# Patient Record
Sex: FEMALE | Race: OTHER | HISPANIC OR LATINO
[De-identification: names, ages, dates, MRNs, and addresses within clinical notes are randomized per-mention and may not be internally consistent; named-entity substitution may affect disease eponyms.]

---

## 2021-01-06 ENCOUNTER — ASOB RESULT (OUTPATIENT)
Age: 41
End: 2021-01-06

## 2021-01-06 ENCOUNTER — APPOINTMENT (OUTPATIENT)
Dept: ANTEPARTUM | Facility: CLINIC | Age: 41
End: 2021-01-06
Payer: MEDICAID

## 2021-01-06 ENCOUNTER — APPOINTMENT (OUTPATIENT)
Dept: MATERNAL FETAL MEDICINE | Facility: CLINIC | Age: 41
End: 2021-01-06
Payer: MEDICAID

## 2021-01-06 PROCEDURE — 99441: CPT

## 2021-01-06 PROCEDURE — 99072 ADDL SUPL MATRL&STAF TM PHE: CPT

## 2021-01-06 PROCEDURE — 76817 TRANSVAGINAL US OBSTETRIC: CPT

## 2021-01-06 PROCEDURE — 76811 OB US DETAILED SNGL FETUS: CPT

## 2021-01-18 ENCOUNTER — ASOB RESULT (OUTPATIENT)
Age: 41
End: 2021-01-18

## 2021-01-18 ENCOUNTER — APPOINTMENT (OUTPATIENT)
Dept: ANTEPARTUM | Facility: CLINIC | Age: 41
End: 2021-01-18
Payer: MEDICAID

## 2021-01-18 PROCEDURE — 76815 OB US LIMITED FETUS(S): CPT

## 2021-01-18 PROCEDURE — 99072 ADDL SUPL MATRL&STAF TM PHE: CPT

## 2021-01-18 PROCEDURE — 76817 TRANSVAGINAL US OBSTETRIC: CPT

## 2021-02-15 ENCOUNTER — ASOB RESULT (OUTPATIENT)
Age: 41
End: 2021-02-15

## 2021-02-15 ENCOUNTER — APPOINTMENT (OUTPATIENT)
Dept: ANTEPARTUM | Facility: CLINIC | Age: 41
End: 2021-02-15
Payer: MEDICAID

## 2021-02-15 PROCEDURE — 76816 OB US FOLLOW-UP PER FETUS: CPT

## 2021-02-15 PROCEDURE — 99072 ADDL SUPL MATRL&STAF TM PHE: CPT

## 2021-02-15 PROCEDURE — 76817 TRANSVAGINAL US OBSTETRIC: CPT

## 2021-03-01 ENCOUNTER — APPOINTMENT (OUTPATIENT)
Dept: ANTEPARTUM | Facility: CLINIC | Age: 41
End: 2021-03-01

## 2021-03-04 ENCOUNTER — ASOB RESULT (OUTPATIENT)
Age: 41
End: 2021-03-04

## 2021-03-04 ENCOUNTER — APPOINTMENT (OUTPATIENT)
Dept: MATERNAL FETAL MEDICINE | Facility: CLINIC | Age: 41
End: 2021-03-04
Payer: MEDICAID

## 2021-03-04 VITALS — WEIGHT: 169 LBS | HEIGHT: 62 IN | BODY MASS INDEX: 31.1 KG/M2

## 2021-03-04 DIAGNOSIS — Z87.59 PERSONAL HISTORY OF OTHER COMPLICATIONS OF PREGNANCY, CHILDBIRTH AND THE PUERPERIUM: ICD-10-CM

## 2021-03-04 PROCEDURE — G0108 DIAB MANAGE TRN  PER INDIV: CPT | Mod: 95

## 2021-03-22 ENCOUNTER — APPOINTMENT (OUTPATIENT)
Dept: MATERNAL FETAL MEDICINE | Facility: CLINIC | Age: 41
End: 2021-03-22
Payer: MEDICAID

## 2021-03-22 ENCOUNTER — APPOINTMENT (OUTPATIENT)
Dept: ANTEPARTUM | Facility: CLINIC | Age: 41
End: 2021-03-22
Payer: MEDICAID

## 2021-03-22 ENCOUNTER — ASOB RESULT (OUTPATIENT)
Age: 41
End: 2021-03-22

## 2021-03-22 VITALS
OXYGEN SATURATION: 98 % | WEIGHT: 167 LBS | HEIGHT: 62 IN | HEART RATE: 78 BPM | BODY MASS INDEX: 30.73 KG/M2 | RESPIRATION RATE: 18 BRPM | DIASTOLIC BLOOD PRESSURE: 62 MMHG | SYSTOLIC BLOOD PRESSURE: 102 MMHG

## 2021-03-22 DIAGNOSIS — O34.219 MATERNAL CARE FOR UNSPECIFIED TYPE SCAR FROM PREVIOUS CESAREAN DELIVERY: ICD-10-CM

## 2021-03-22 DIAGNOSIS — O09.213 SUPERVISION OF PREGNANCY WITH HISTORY OF PRE-TERM LABOR, THIRD TRIMESTER: ICD-10-CM

## 2021-03-22 DIAGNOSIS — O09.293 SUPERVISION OF PREGNANCY WITH OTHER POOR REPRODUCTIVE OR OBSTETRIC HISTORY, THIRD TRIMESTER: ICD-10-CM

## 2021-03-22 DIAGNOSIS — Z87.51 PERSONAL HISTORY OF PRE-TERM LABOR: ICD-10-CM

## 2021-03-22 DIAGNOSIS — O09.523 SUPERVISION OF ELDERLY MULTIGRAVIDA, THIRD TRIMESTER: ICD-10-CM

## 2021-03-22 DIAGNOSIS — O99.213 OBESITY COMPLICATING PREGNANCY, THIRD TRIMESTER: ICD-10-CM

## 2021-03-22 PROCEDURE — 76816 OB US FOLLOW-UP PER FETUS: CPT

## 2021-03-22 PROCEDURE — 99072 ADDL SUPL MATRL&STAF TM PHE: CPT

## 2021-03-22 PROCEDURE — 99205 OFFICE O/P NEW HI 60 MIN: CPT | Mod: TH

## 2021-03-22 RX ORDER — METOCLOPRAMIDE 10 MG/1
10 TABLET ORAL
Qty: 90 | Refills: 0 | Status: DISCONTINUED | COMMUNITY
Start: 2020-11-09

## 2021-03-22 RX ORDER — DOCUSATE SODIUM 100 MG/1
100 CAPSULE, LIQUID FILLED ORAL
Qty: 30 | Refills: 0 | Status: DISCONTINUED | COMMUNITY
Start: 2020-10-28

## 2021-03-22 RX ORDER — ONDANSETRON 4 MG/1
4 TABLET ORAL
Qty: 10 | Refills: 0 | Status: DISCONTINUED | COMMUNITY
Start: 2020-10-05

## 2021-03-22 RX ORDER — ONDANSETRON 4 MG/1
4 TABLET, ORALLY DISINTEGRATING ORAL
Qty: 20 | Refills: 0 | Status: DISCONTINUED | COMMUNITY
Start: 2020-12-15

## 2021-03-22 RX ORDER — UBIDECARENONE/VIT E ACET 100MG-5
25 MCG CAPSULE ORAL
Qty: 30 | Refills: 0 | Status: DISCONTINUED | COMMUNITY
Start: 2020-10-28

## 2021-03-22 NOTE — FAMILY HISTORY
[Age 35+ During Pregnancy] : not 35 or over during pregnancy [Reported Family History Of Birth Defects] : no congenital heart defects [Orestes-Sachs Carrier] : no Orestes-Sachs [Family History] : no mental retardation/autism [Reported Family History Of Genetic Disease] : no history of child defect in child of baby father

## 2021-03-22 NOTE — VITALS
[LMP (date): ___] : LMP was on [unfilled] [GA =___ Weeks] : which calculates to a GA of [unfilled] weeks [GA= ___ Days] : and [unfilled] day(s) [MILA by LMP (date): ___] : The calculated MILA by LMP is [unfilled] [By LMP] : this is the final MILA

## 2021-03-22 NOTE — DATA REVIEWED
[FreeTextEntry1] : I reviewed the three-hour GTT test results, genetic counseling report, diabetes educator report, and ultrasound reports.

## 2021-03-22 NOTE — DISCUSSION/SUMMARY
[FreeTextEntry1] : She is 31 weeks and 2 days gestation by her last menstrual period dates.\par \par She is overweight and obesity has been associated with a number of maternal complications such as gestational diabetes, pre-eclampsia, thrombophlebitis, labor abnormalities, post-term pregnancies,  delivery, and operative complications. Obesity has been associated with adverse fetal outcomes such as late stillbirth and  deliveries.  Obese women also have a two to three-fold increased incidence in congenital anomalies. There were no major fetal malformations reported during the fetal anatomy ultrasound examination. She now has gestational diabetes. \par \par Regarding her gestational diabetes, she states that she has been following the recommended diabetic diet. She performs fasting and 2 hour postprandial self glucose monitoring. She was advised to stop doing two-hour postprandial glucose determination and to start doing a one hour postprandial glucose determination from the beginning of the meal. My review of her glucose log book from 3/7/21 to 3/22/21 revealed 9/15 = 60% elevated fasting glucose values. She had 6 elevated postprandial glucose values. She has suboptimal glycemic control. I informed her that maintaining euglycemia is the most important factor associated with good  outcomes in pregnancies complicated by gestational diabetes. I told her that poor glucose control may cause fetal macrosomia, shoulder dystocia,  delivery,  respiratory distress syndrome and  metabolic complications such as hypoglycemia and hyperbilirubinemia.  I told her that she is at risk for developing gestational hypertension or preeclampsia during the current pregnancy. I also told her that she is at risk for developing type 2 diabetes, metabolic syndrome and cardiovascular disease later in life.  I also recommend a 75 gram 2 hour OGTT approximately 6 - 8 weeks postpartum to determine whether she has impaired glucose tolerance or preexisting diabetes not diagnosed prior to the pregnancy. I gave her additional dietary counseling. I gave her written examples of meals and snacks in Hebrew. I told her which foods to eat and which foods not to eat. I told her to eat three daily meals with 3 snacks to reduce postprandial glucose fluctuations. I believe that she needs more nutritional counseling. I made arrangements for her to see our diabetes educator on 3/25/21. I told her that if her fasting glucose values do not improve with the diet she will require either oral hypoglycemic medication or insulin injections to lower the fasting glucose. I ordered a hemoglobin A1c level. She was scheduled to have an ultrasound examination and a followup [facility] visit in 4 weeks. \par \par Regarding her advanced maternal age, she had genetic counseling. I told her that advanced maternal age has been associated with a higher incidence of preeclampsia /eclampsia.  I also told her that advanced maternal age has been associated with an increased risk of stillbirth. \par \par She had two vaginal births followed by a  section delivery for breech presentation. She was informed of the risks and benefits of a trial of labor. She was informed of the risk of uterine rupture and the associated maternal complications such as hysterectomy, blood transfusions, and intensive care unit admission. She was also informed of the associated  adverse outcomes in cases of uterine rupture such as stillbirth, fetal hypoxia and neurological impairment (cerebral palsy). She told me she wants to have a trial of labor in spite of the risk for maternal and fetal complications.  \par \par I recommend the Tdap vaccine between 27 and 36 weeks of gestation to prevent pertussis infection in the  infant. I recommend the flu vaccine during flu season (October through May). She should have serial ultrasounds to evaluate fetal growth and development.  I also suggest fetal surveillance during the third trimester of pregnancy with weekly NSTs or BPPs starting at 36 weeks gestation.  She can also perform daily fetal movement counts as an adjunct to the NSTs or BPPs.\par \par \par  \par \par

## 2021-03-22 NOTE — OB HISTORY
[Pregnancy History] : patient received anesthesia [___] : pregnancy complications occured [LMP: ___] : LMP: [unfilled] [MILA: ___] : MILA: [unfilled] [EGA: ___ wks] : EGA: [unfilled] wks [Spontaneous] : Spontaneous conception [Sonogram] : sonogram [at ___ wks] : at [unfilled] weeks [Definite:  ___ (Date)] : the last menstrual period was [unfilled] [FreeTextEntry1] : Her prenatal record was not available for review.\par \par She had a genetic counseling session done on January 6, 2021 because of her advanced maternal age and Fragile X intermediate (gray zone) testing results . She declined to have prenatal diagnostic testing. She had a noninvasive prenatal screen test that reported a low risk for fetal chromosomal abnormalities.\par \par A three-hour GTT done on February 23, 2021 reported a fasting glucose of 88, one hour glucose of 181, 2 hour glucose of 155, and three-hour glucose of 94. There were 2 elevated glucose values consistent with a diagnosis of gestational diabetes.\par \par She was seen by our diabetes educator on March 4, 2021 for initial diabetes education and counseling.\par \par

## 2021-03-23 LAB
ESTIMATED AVERAGE GLUCOSE: 103 MG/DL
HBA1C MFR BLD HPLC: 5.2 %

## 2021-03-25 ENCOUNTER — ASOB RESULT (OUTPATIENT)
Age: 41
End: 2021-03-25

## 2021-03-25 ENCOUNTER — APPOINTMENT (OUTPATIENT)
Dept: MATERNAL FETAL MEDICINE | Facility: CLINIC | Age: 41
End: 2021-03-25
Payer: MEDICAID

## 2021-03-25 PROCEDURE — G0108 DIAB MANAGE TRN  PER INDIV: CPT | Mod: 95

## 2021-04-09 ENCOUNTER — ASOB RESULT (OUTPATIENT)
Age: 41
End: 2021-04-09

## 2021-04-09 ENCOUNTER — APPOINTMENT (OUTPATIENT)
Dept: MATERNAL FETAL MEDICINE | Facility: CLINIC | Age: 41
End: 2021-04-09
Payer: MEDICAID

## 2021-04-09 ENCOUNTER — TRANSCRIPTION ENCOUNTER (OUTPATIENT)
Age: 41
End: 2021-04-09

## 2021-04-09 PROCEDURE — G0108 DIAB MANAGE TRN  PER INDIV: CPT | Mod: 95

## 2021-04-12 ENCOUNTER — NON-APPOINTMENT (OUTPATIENT)
Age: 41
End: 2021-04-12

## 2021-04-12 ENCOUNTER — APPOINTMENT (OUTPATIENT)
Dept: ANTEPARTUM | Facility: CLINIC | Age: 41
End: 2021-04-12
Payer: MEDICAID

## 2021-04-12 ENCOUNTER — ASOB RESULT (OUTPATIENT)
Age: 41
End: 2021-04-12

## 2021-04-12 PROCEDURE — 99072 ADDL SUPL MATRL&STAF TM PHE: CPT

## 2021-04-12 PROCEDURE — 76816 OB US FOLLOW-UP PER FETUS: CPT

## 2021-04-12 PROCEDURE — 76819 FETAL BIOPHYS PROFIL W/O NST: CPT

## 2021-04-19 ENCOUNTER — APPOINTMENT (OUTPATIENT)
Dept: ANTEPARTUM | Facility: CLINIC | Age: 41
End: 2021-04-19
Payer: MEDICAID

## 2021-04-19 ENCOUNTER — ASOB RESULT (OUTPATIENT)
Age: 41
End: 2021-04-19

## 2021-04-19 ENCOUNTER — APPOINTMENT (OUTPATIENT)
Dept: MATERNAL FETAL MEDICINE | Facility: CLINIC | Age: 41
End: 2021-04-19
Payer: MEDICAID

## 2021-04-19 VITALS
WEIGHT: 171 LBS | RESPIRATION RATE: 16 BRPM | BODY MASS INDEX: 31.47 KG/M2 | SYSTOLIC BLOOD PRESSURE: 100 MMHG | HEIGHT: 62 IN | OXYGEN SATURATION: 98 % | HEART RATE: 78 BPM | DIASTOLIC BLOOD PRESSURE: 68 MMHG

## 2021-04-19 DIAGNOSIS — O24.410 GESTATIONAL DIABETES MELLITUS IN PREGNANCY, DIET CONTROLLED: ICD-10-CM

## 2021-04-19 DIAGNOSIS — O24.419 GESTATIONAL DIABETES MELLITUS IN PREGNANCY, UNSPECIFIED CONTROL: ICD-10-CM

## 2021-04-19 PROCEDURE — 76820 UMBILICAL ARTERY ECHO: CPT

## 2021-04-19 PROCEDURE — 93976 VASCULAR STUDY: CPT

## 2021-04-19 PROCEDURE — 76819 FETAL BIOPHYS PROFIL W/O NST: CPT

## 2021-04-19 PROCEDURE — 99214 OFFICE O/P EST MOD 30 MIN: CPT

## 2021-04-19 PROCEDURE — 99072 ADDL SUPL MATRL&STAF TM PHE: CPT

## 2021-04-19 RX ORDER — GLYBURIDE 1.25 MG/1
1.25 TABLET ORAL
Qty: 30 | Refills: 0 | Status: DISCONTINUED | COMMUNITY
Start: 2021-04-06 | End: 2021-04-19

## 2021-04-19 NOTE — DISCUSSION/SUMMARY
[FreeTextEntry1] : We had the pleasure of meeting with your patient, Ev Camarillo, for a maternal-fetal medicine consultation. The patient is a 40-year-old  at 35 2/7 weeks of gestation with a pregnancy complicated by gestational diabetes and advanced maternal age.\par \par She reports no complaints today. She denies headaches, vision changes, and abdominal/epigastric/right upper quadrant pain. She denies nausea and vomiting. She denies contractions, leaking and bleeding and reports good fetal movement.\par \par She was briefly taking glyburide previously but had some low values and discontinued the medication.\par \par /68\par \par She was extensively counseled regarding the following issues:\par \par •	Gestational diabetes\par \par Ev understands that fasting glucose values should be <90 and 1-hour postprandial values should be <140. Her fingerstick log was reviewed. Fasting fingerstick glucose values have mostly been greater than 90 recently. Postprandial values are at goal. She was instructed to continue checking fingersticks 4 times daily and to bring her log to all appointments. I recommend starting metformin 500 mg at bedtime--Rx sent to pharmacy. Weekly antepartum fetal surveillance is scheduled until delivery. She is encouraged to have a two-hour glucose tolerance test at 6-8 weeks postpartum to evaluate for diabetes mellitus and insulin resistance. \par \par As of now, delivery is recommended between 39 0/7 and 39 6/7 weeks for this patient with gestational diabetes on medication. If her glucose control does not improve, earlier delivery may be recommended to avoid fetopathy and stillbirth.\par \par She will follow up with the diabetes educator on 21\par \par \par Thank you for requesting a consultation on this patient for gestational diabetes. The majority of time (>50%) was spent on counseling and coordination of care with this patient. The physician face-to-face time was 34 minutes.\par \par At the end of our discussion, the patient indicated that her questions were answered and she seemed satisfied with our discussion. Please do not hesitate to contact us with any questions.\par \par \par Sincerely,\par \par \par Amadou Estes MD, NELSON\par Attending Physician, Maternal-Fetal Medicine\par

## 2021-04-19 NOTE — OB HISTORY
[Pregnancy History] : patient received anesthesia [___] : pregnancy complications occured [LMP: ___] : LMP: [unfilled] [MILA: ___] : MILA: [unfilled] [EGA: ___ wks] : EGA: [unfilled] wks [Spontaneous] : Spontaneous conception [Sonogram] : sonogram [at ___ wks] : at [unfilled] weeks [FreeTextEntry1] : Her pregnancy is complicated by GDM [Definite:  ___ (Date)] : the last menstrual period was [unfilled]

## 2021-04-26 ENCOUNTER — APPOINTMENT (OUTPATIENT)
Dept: ANTEPARTUM | Facility: CLINIC | Age: 41
End: 2021-04-26
Payer: MEDICAID

## 2021-04-26 ENCOUNTER — ASOB RESULT (OUTPATIENT)
Age: 41
End: 2021-04-26

## 2021-04-26 PROCEDURE — 76820 UMBILICAL ARTERY ECHO: CPT

## 2021-04-26 PROCEDURE — 76818 FETAL BIOPHYS PROFILE W/NST: CPT

## 2021-04-26 PROCEDURE — 99072 ADDL SUPL MATRL&STAF TM PHE: CPT

## 2021-04-30 ENCOUNTER — ASOB RESULT (OUTPATIENT)
Age: 41
End: 2021-04-30

## 2021-04-30 ENCOUNTER — APPOINTMENT (OUTPATIENT)
Dept: MATERNAL FETAL MEDICINE | Facility: CLINIC | Age: 41
End: 2021-04-30
Payer: MEDICAID

## 2021-04-30 PROCEDURE — G0108 DIAB MANAGE TRN  PER INDIV: CPT | Mod: 95

## 2021-05-03 ENCOUNTER — APPOINTMENT (OUTPATIENT)
Dept: ANTEPARTUM | Facility: CLINIC | Age: 41
End: 2021-05-03
Payer: MEDICAID

## 2021-05-03 ENCOUNTER — ASOB RESULT (OUTPATIENT)
Age: 41
End: 2021-05-03

## 2021-05-03 PROCEDURE — 99072 ADDL SUPL MATRL&STAF TM PHE: CPT

## 2021-05-03 PROCEDURE — 76820 UMBILICAL ARTERY ECHO: CPT

## 2021-05-03 PROCEDURE — 76818 FETAL BIOPHYS PROFILE W/NST: CPT

## 2021-05-10 ENCOUNTER — ASOB RESULT (OUTPATIENT)
Age: 41
End: 2021-05-10

## 2021-05-10 ENCOUNTER — APPOINTMENT (OUTPATIENT)
Dept: MATERNAL FETAL MEDICINE | Facility: CLINIC | Age: 41
End: 2021-05-10
Payer: MEDICAID

## 2021-05-10 ENCOUNTER — APPOINTMENT (OUTPATIENT)
Dept: ANTEPARTUM | Facility: CLINIC | Age: 41
End: 2021-05-10
Payer: MEDICAID

## 2021-05-10 PROCEDURE — 76818 FETAL BIOPHYS PROFILE W/NST: CPT

## 2021-05-10 PROCEDURE — 76820 UMBILICAL ARTERY ECHO: CPT

## 2021-05-10 PROCEDURE — 76816 OB US FOLLOW-UP PER FETUS: CPT

## 2021-05-10 PROCEDURE — 93976 VASCULAR STUDY: CPT

## 2021-05-10 PROCEDURE — G0108 DIAB MANAGE TRN  PER INDIV: CPT | Mod: 95

## 2021-05-17 ENCOUNTER — APPOINTMENT (OUTPATIENT)
Dept: ANTEPARTUM | Facility: CLINIC | Age: 41
End: 2021-05-17
Payer: MEDICAID

## 2021-05-17 ENCOUNTER — NON-APPOINTMENT (OUTPATIENT)
Age: 41
End: 2021-05-17

## 2021-05-17 ENCOUNTER — ASOB RESULT (OUTPATIENT)
Age: 41
End: 2021-05-17

## 2021-05-17 PROCEDURE — 76820 UMBILICAL ARTERY ECHO: CPT

## 2021-05-17 PROCEDURE — 76818 FETAL BIOPHYS PROFILE W/NST: CPT

## 2021-07-07 ENCOUNTER — APPOINTMENT (OUTPATIENT)
Dept: FAMILY MEDICINE | Facility: CLINIC | Age: 41
End: 2021-07-07
Payer: MEDICAID

## 2021-07-07 VITALS — SYSTOLIC BLOOD PRESSURE: 98 MMHG | DIASTOLIC BLOOD PRESSURE: 70 MMHG

## 2021-07-07 VITALS
TEMPERATURE: 97.1 F | HEART RATE: 87 BPM | BODY MASS INDEX: 27.97 KG/M2 | HEIGHT: 62 IN | OXYGEN SATURATION: 98 % | WEIGHT: 152 LBS

## 2021-07-07 VITALS — SYSTOLIC BLOOD PRESSURE: 101 MMHG | DIASTOLIC BLOOD PRESSURE: 60 MMHG

## 2021-07-07 DIAGNOSIS — Z82.49 FAMILY HISTORY OF ISCHEMIC HEART DISEASE AND OTHER DISEASES OF THE CIRCULATORY SYSTEM: ICD-10-CM

## 2021-07-07 LAB
BASOPHILS # BLD AUTO: 0.06 K/UL
BASOPHILS NFR BLD AUTO: 1.3 %
EOSINOPHIL # BLD AUTO: 0.22 K/UL
EOSINOPHIL NFR BLD AUTO: 4.7 %
HCT VFR BLD CALC: 41.3 %
HGB BLD-MCNC: 13.4 G/DL
HIV1+2 AB SPEC QL IA.RAPID: NONREACTIVE
IMM GRANULOCYTES NFR BLD AUTO: 0.2 %
LYMPHOCYTES # BLD AUTO: 1.56 K/UL
LYMPHOCYTES NFR BLD AUTO: 33.3 %
MAN DIFF?: NORMAL
MCHC RBC-ENTMCNC: 29.5 PG
MCHC RBC-ENTMCNC: 32.4 GM/DL
MCV RBC AUTO: 91 FL
MONOCYTES # BLD AUTO: 0.43 K/UL
MONOCYTES NFR BLD AUTO: 9.2 %
NEUTROPHILS # BLD AUTO: 2.41 K/UL
NEUTROPHILS NFR BLD AUTO: 51.3 %
PLATELET # BLD AUTO: 250 K/UL
RBC # BLD: 4.54 M/UL
RBC # FLD: 12.6 %
WBC # FLD AUTO: 4.69 K/UL

## 2021-07-07 PROCEDURE — 99385 PREV VISIT NEW AGE 18-39: CPT | Mod: 25

## 2021-07-07 RX ORDER — ASCORBIC ACID, CHOLECALCIFEROL, .ALPHA.-TOCOPHEROL ACETATE, DL-, PYRIDOXINE, FOLIC ACID, CYANOCOBALAMIN, CALCIUM, FERROUS FUMARATE, MAGNESIUM, DOCONEXENT 85; 200; 10; 25; 1; 12; 140; 27; 45; 300 [IU]/1; [IU]/1; [IU]/1; [IU]/1; MG/1; UG/1; MG/1; MG/1; MG/1; MG/1
27-0.6-0.4-3 CAPSULE, GELATIN COATED ORAL
Qty: 30 | Refills: 0 | Status: DISCONTINUED | COMMUNITY
Start: 2021-03-04 | End: 2021-07-07

## 2021-07-07 RX ORDER — LANCETS
EACH MISCELLANEOUS
Qty: 3 | Refills: 3 | Status: DISCONTINUED | COMMUNITY
Start: 2021-03-04 | End: 2021-07-07

## 2021-07-07 RX ORDER — BLOOD SUGAR DIAGNOSTIC
STRIP MISCELLANEOUS
Qty: 3 | Refills: 3 | Status: DISCONTINUED | COMMUNITY
Start: 2021-03-04 | End: 2021-07-07

## 2021-07-07 RX ORDER — METFORMIN HYDROCHLORIDE 500 MG/1
500 TABLET, COATED ORAL
Qty: 30 | Refills: 1 | Status: DISCONTINUED | COMMUNITY
Start: 2021-04-19 | End: 2021-07-07

## 2021-07-07 RX ORDER — BLOOD-GLUCOSE METER
W/DEVICE KIT MISCELLANEOUS
Qty: 1 | Refills: 0 | Status: DISCONTINUED | COMMUNITY
Start: 2021-03-04 | End: 2021-07-07

## 2021-07-07 RX ORDER — CHLORHEXIDINE GLUCONATE 4 %
325 (65 FE) LIQUID (ML) TOPICAL
Qty: 60 | Refills: 0 | Status: DISCONTINUED | COMMUNITY
Start: 2021-03-02 | End: 2021-07-07

## 2021-07-07 NOTE — HISTORY OF PRESENT ILLNESS
[FreeTextEntry1] : pt. here for cpe [de-identified] : 40y  w/ gestational DM, presenting to establish care. She is feeling well today, no complaints.\par \par Hx of Gestational DM:  She used to take glyburide 2.5mg once daily during her pregnancy. she delivered at [place] a month ago- c section. they sent her home with 45 days of lovenox 40mg once daily and she is not sure why. she delivered at 40 weeks: planned c section bc previous one was  bc of breech. she only had gestational DM with the last pregnancy. BP was controlled. she does not have a hx of clots or a blood disorder in her  or her family.\par \par also taking calcium 600mg-vit D3 10mcg daily, vitmain D 1000 IU, and multi vitamin prenatal. \par \par \par

## 2021-07-07 NOTE — HEALTH RISK ASSESSMENT
[No] : In the past 12 months have you used drugs other than those required for medical reasons? No [0] : 2) Feeling down, depressed, or hopeless: Not at all (0) [Patient reported PAP Smear was normal] : Patient reported PAP Smear was normal [HIV Test offered] : HIV Test offered [With Family] : lives with family [Unemployed] : unemployed [] : No [PBI4Seuza] : 0 [PapSmearDate] : 01/2020 [PapSmearComments] : Azam Velasquez

## 2021-07-07 NOTE — ASSESSMENT
[FreeTextEntry1] : Physical Exam:\par Constitutional: No acute distress, well appearing\par HEENT: Normocephalic, atraumatic\par Neck: supple\par Cardiac: S1S2, Regular rate and rhythm, No murmurs\par Pulmonary: No respiratory distress, Lungs clear to auscultation bilaterally, no wheezing, rales, or rhonchi\par Abdomen: Soft, non-tender, non-distended, no guarding, normal bowel sounds\par Vascular: No peripheral edema\par Neurology: Coordination grossly intact, no focal deficits\par Psychiatric: Alert and oriented x3, normal mood\par \par \par \par HCM:\par - f/u bloodwork drawn in office. will call with results and have her rtc if any abnormalities.\par - she got the TDAP shot with this last pregnancy- due again in 2030\par - cervical CA screening- UTD on paps\par - will task office to get records from [facility].

## 2021-07-08 LAB
25(OH)D3 SERPL-MCNC: 52.7 NG/ML
ALBUMIN SERPL ELPH-MCNC: 4.6 G/DL
ALP BLD-CCNC: 75 U/L
ALT SERPL-CCNC: 94 U/L
ANION GAP SERPL CALC-SCNC: 16 MMOL/L
AST SERPL-CCNC: 50 U/L
BILIRUB SERPL-MCNC: 0.4 MG/DL
BUN SERPL-MCNC: 15 MG/DL
CALCIUM SERPL-MCNC: 9.4 MG/DL
CHLORIDE SERPL-SCNC: 105 MMOL/L
CHOLEST SERPL-MCNC: 178 MG/DL
CO2 SERPL-SCNC: 20 MMOL/L
CREAT SERPL-MCNC: 0.84 MG/DL
ESTIMATED AVERAGE GLUCOSE: 111 MG/DL
GLUCOSE SERPL-MCNC: 92 MG/DL
HBA1C MFR BLD HPLC: 5.5 %
HDLC SERPL-MCNC: 52 MG/DL
LDLC SERPL CALC-MCNC: 107 MG/DL
NONHDLC SERPL-MCNC: 126 MG/DL
POTASSIUM SERPL-SCNC: 4.6 MMOL/L
PROT SERPL-MCNC: 6.9 G/DL
SODIUM SERPL-SCNC: 141 MMOL/L
T4 FREE SERPL-MCNC: 0.9 NG/DL
TRIGL SERPL-MCNC: 95 MG/DL
TSH SERPL-ACNC: 1.08 UIU/ML
VIT B12 SERPL-MCNC: 706 PG/ML

## 2021-07-21 ENCOUNTER — LABORATORY RESULT (OUTPATIENT)
Age: 41
End: 2021-07-21

## 2021-07-21 ENCOUNTER — APPOINTMENT (OUTPATIENT)
Dept: FAMILY MEDICINE | Facility: CLINIC | Age: 41
End: 2021-07-21
Payer: MEDICAID

## 2021-07-21 VITALS — SYSTOLIC BLOOD PRESSURE: 92 MMHG | DIASTOLIC BLOOD PRESSURE: 65 MMHG

## 2021-07-21 VITALS
TEMPERATURE: 96.5 F | OXYGEN SATURATION: 98 % | WEIGHT: 149.2 LBS | BODY MASS INDEX: 27.29 KG/M2 | RESPIRATION RATE: 14 BRPM | SYSTOLIC BLOOD PRESSURE: 100 MMHG | DIASTOLIC BLOOD PRESSURE: 72 MMHG | HEART RATE: 76 BPM

## 2021-07-21 DIAGNOSIS — R74.8 ABNORMAL LEVELS OF OTHER SERUM ENZYMES: ICD-10-CM

## 2021-07-21 DIAGNOSIS — L81.9 DISORDER OF PIGMENTATION, UNSPECIFIED: ICD-10-CM

## 2021-07-21 PROCEDURE — 99213 OFFICE O/P EST LOW 20 MIN: CPT | Mod: 25

## 2021-07-21 NOTE — HISTORY OF PRESENT ILLNESS
[FreeTextEntry1] : Pt is here for laboratory review. [de-identified] : 40y F w/ PMhx gestational DM, HLD, presenting for f/u blood test results. Her old obngyn Azam Velasquez in [place]\par \par HLD:  on recent labs. \par \par AST: 50, ALT: 94: denies any meds,  herbal, drugs, alchol abuse, hx of fatty liver\par denies any nausea, vomiting, diarrhea, hx of hepatitis. Never had issues with her liver. \par \par \par

## 2021-07-21 NOTE — ASSESSMENT
[FreeTextEntry1] : Physical Exam:\par Constitutional: No acute distress, well appearing\par HEENT: Normocephalic, atraumatic\par Neck: supple\par Cardiac: S1S2, Regular rate and rhythm, No murmurs\par Pulmonary: No respiratory distress, Lungs clear to auscultation bilaterally, no wheezing, rales, or rhonchi\par Abdomen: Soft, non-tender, non-distended, no guarding, normal bowel sounds\par Vascular: No peripheral edema\par Neurology: Coordination grossly intact, no focal deficits\par Psychiatric: Alert and oriented x3, normal mood\par Skin: depigmentation patches in her axilla and legs and abdomen\par \par \par A/P:\par HLD: \par - advised to improve diet and increase exercise\par \par AST: 50, ALT: 94:\par Asymptomatic \par Physical exam wnl, no hepatomegaly or TTP\par - LFTs, hepatitis panel\par - liver sono\par \par skin lesions\par - will refer to derm for skin depigmentation

## 2021-07-22 ENCOUNTER — NON-APPOINTMENT (OUTPATIENT)
Age: 41
End: 2021-07-22

## 2021-07-22 LAB
ALBUMIN SERPL ELPH-MCNC: 4.6 G/DL
ALP BLD-CCNC: 77 U/L
ALT SERPL-CCNC: 75 U/L
AST SERPL-CCNC: 41 U/L
BILIRUB DIRECT SERPL-MCNC: 0.2 MG/DL
BILIRUB INDIRECT SERPL-MCNC: 0.4 MG/DL
BILIRUB SERPL-MCNC: 0.6 MG/DL
FERRITIN SERPL-MCNC: 100 NG/ML
HBV CORE IGM SER QL: NONREACTIVE
HBV DNA # SERPL NAA+PROBE: NOT DETECTED
HBV E AB SER QL: NONREACTIVE
HBV E AG SER QL: NONREACTIVE
HBV SURFACE AB SER QL: NONREACTIVE
HBV SURFACE AB SERPL IA-ACNC: <3 MIU/ML
HBV SURFACE AG SER QL: NONREACTIVE
HCV AB SER QL: NONREACTIVE
HCV RNA SERPL NAA DL=5-ACNC: NOT DETECTED IU/ML
HCV RNA SERPL NAA+PROBE-LOG IU: NOT DETECTED LOG10IU/ML
HCV S/CO RATIO: 0.16 S/CO
HEPATITIS A IGG ANTIBODY: REACTIVE
HEPB DNA PCR LOG: NOT DETECTED LOG10IU/ML
IRON SATN MFR SERPL: 25 %
IRON SERPL-MCNC: 93 UG/DL
PROT SERPL-MCNC: 6.8 G/DL
TIBC SERPL-MCNC: 380 UG/DL
TRANSFERRIN SERPL-MCNC: 279 MG/DL
UIBC SERPL-MCNC: 287 UG/DL

## 2021-07-27 LAB — HCV RNA FLD QL NAA+PROBE: NEGATIVE

## 2021-08-04 ENCOUNTER — NON-APPOINTMENT (OUTPATIENT)
Age: 41
End: 2021-08-04

## 2021-08-06 ENCOUNTER — NON-APPOINTMENT (OUTPATIENT)
Age: 41
End: 2021-08-06

## 2021-12-06 ENCOUNTER — APPOINTMENT (OUTPATIENT)
Dept: FAMILY MEDICINE | Facility: CLINIC | Age: 41
End: 2021-12-06

## 2021-12-17 ENCOUNTER — EMERGENCY (EMERGENCY)
Facility: HOSPITAL | Age: 41
LOS: 1 days | Discharge: HOME | End: 2021-12-17
Attending: STUDENT IN AN ORGANIZED HEALTH CARE EDUCATION/TRAINING PROGRAM
Payer: COMMERCIAL

## 2021-12-17 VITALS
SYSTOLIC BLOOD PRESSURE: 112 MMHG | DIASTOLIC BLOOD PRESSURE: 78 MMHG | HEART RATE: 77 BPM | TEMPERATURE: 98 F | OXYGEN SATURATION: 100 % | RESPIRATION RATE: 16 BRPM

## 2021-12-17 LAB — SARS-COV-2 RNA SPEC QL NAA+PROBE: SIGNIFICANT CHANGE UP

## 2021-12-17 PROCEDURE — U0005: CPT

## 2021-12-17 PROCEDURE — U0003: CPT

## 2021-12-17 PROCEDURE — 99283 EMERGENCY DEPT VISIT LOW MDM: CPT

## 2021-12-17 PROCEDURE — 99282 EMERGENCY DEPT VISIT SF MDM: CPT

## 2021-12-17 NOTE — ED PROVIDER NOTE - PATIENT PORTAL LINK FT
You can access the FollowMyHealth Patient Portal offered by [facility] by registering at the following website: [email or web address]. By joining [facility]’s FollowMyHealth portal, you will also be able to view your health information using other applications (apps) compatible with our system.

## 2021-12-17 NOTE — ED ADULT TRIAGE NOTE - TEMPERATURE IN FAHRENHEIT (DEGREES F)
Nurse's notes (such as: Visit Notes) reviewed and accepted.  Social History     Tobacco Use   • Smoking status: Never Smoker   • Smokeless tobacco: Never Used   • Tobacco comment: dad smokes outdoors occasionally   Substance Use Topics   • Alcohol use: Yes     Alcohol/week: 4.0 standard drinks     Types: 2 Standard drinks or equivalent, 2 Shots of liquor per week     Frequency: 2-4 times a month     Drinks per session: 3 or 4     Binge frequency: Never   • Drug use: No     Family History   Problem Relation Age of Onset   • Other Maternal Grandmother         cataracts, macular degeneration   • Hypertension Maternal Grandfather    • Hypertension Maternal Aunt      ALLERGIES:   Allergen Reactions   • Latex RASH   • [place]   (Food Or Med) Other (See Comments)     Tongue peels and the jaw stiffens.      Current Outpatient Medications   Medication Sig Dispense Refill   • omeprazole (PRILOSEC) 40 MG capsule Take 1 capsule by mouth daily. in morning 30 minutes before first meal of day. 90 capsule 1   • adapalene (DIFFERIN) 0.3 % gel apply a pea-sized amount to face at bedtime 45 g 3   • clindamycin (CLEOCIN T) 1 % topical solution apply a thin layer to face every morning 60 mL 3   • 5-fluorouracil-salicylic acid 5-20 % topical liquid Apply to warts nightly at bedtime. Cover with band-aid or duct tape occlusion. 30 mL 3   • loratadine (CLARITIN) 10 MG tablet Take 10 mg by mouth daily.     • imiquimod (ALDARA) 5 % cream Apply thin layer to warts 3 nights per week 24 each 3   • albuterol 108 (90 Base) MCG/ACT inhaler Inhale 2 puffs into the lungs every 4 hours as needed for Shortness of Breath or Wheezing. 1 Inhaler 0   • oseltamivir (TAMIFLU) 75 MG capsule Take 1 capsule by mouth 2 times daily for 7 days. 14 capsule 0   • dicyclomine (BENTYL) 20 MG tablet Take 1 tablet by mouth up to 4 times daily as needed for abdominal pain 60 tablet 1   • promethazine-dextromethorphan (PROMETHAZINE-DM) 6.25-15 MG/5ML syrup Take 5 mLs  by mouth 4 times daily as needed for Cough. 118 mL 0     No current facility-administered medications for this visit.      Past Medical History:   Diagnosis Date   • Chronic nasal congestion 5/20/2011   • PMH of 12/1/2010    racing heart beat   • Tinea versicolor 5/20/2011        98

## 2021-12-17 NOTE — ED PROVIDER NOTE - PHYSICAL EXAMINATION
Vital Signs per nursing documentation  Gen: well appearing, no acute distress  HEENT: NCAT  Cardiac: regular S1S2  Chest: clear to auscultation bilateral  Abdomen: soft, non tender non distended  Extremity: no gross deformity

## 2021-12-17 NOTE — ED PROVIDER NOTE - NS ED ROS FT
ROS:  GEN: (-) fevers/chills  RESP: (-) shortness of breath, (-) cough  CV: (-) chest pain  GI: (-) nausea, (-) vomiting  NEURO: (-) headache

## 2023-04-26 ENCOUNTER — APPOINTMENT (OUTPATIENT)
Dept: FAMILY MEDICINE | Facility: CLINIC | Age: 43
End: 2023-04-26
Payer: MEDICAID

## 2023-04-26 VITALS
SYSTOLIC BLOOD PRESSURE: 114 MMHG | OXYGEN SATURATION: 99 % | WEIGHT: 136 LBS | BODY MASS INDEX: 25.03 KG/M2 | DIASTOLIC BLOOD PRESSURE: 78 MMHG | TEMPERATURE: 97.7 F | HEIGHT: 62 IN | HEART RATE: 78 BPM | RESPIRATION RATE: 12 BRPM

## 2023-04-26 DIAGNOSIS — Z3A.31 31 WEEKS GESTATION OF PREGNANCY: ICD-10-CM

## 2023-04-26 DIAGNOSIS — J35.1 HYPERTROPHY OF TONSILS: ICD-10-CM

## 2023-04-26 DIAGNOSIS — H61.23 IMPACTED CERUMEN, BILATERAL: ICD-10-CM

## 2023-04-26 DIAGNOSIS — Z00.00 ENCOUNTER FOR GENERAL ADULT MEDICAL EXAMINATION W/OUT ABNORMAL FINDINGS: ICD-10-CM

## 2023-04-26 DIAGNOSIS — E78.5 HYPERLIPIDEMIA, UNSPECIFIED: ICD-10-CM

## 2023-04-26 DIAGNOSIS — Z86.32 PERSONAL HISTORY OF GESTATIONAL DIABETES: ICD-10-CM

## 2023-04-26 DIAGNOSIS — R59.9 ENLARGED LYMPH NODES, UNSPECIFIED: ICD-10-CM

## 2023-04-26 DIAGNOSIS — R06.83 SNORING: ICD-10-CM

## 2023-04-26 PROCEDURE — 99396 PREV VISIT EST AGE 40-64: CPT | Mod: 25

## 2023-04-26 PROCEDURE — G0444 DEPRESSION SCREEN ANNUAL: CPT | Mod: 59

## 2023-04-26 RX ORDER — UBIDECARENONE/VIT E ACET 100MG-5
25 MCG CAPSULE ORAL
Refills: 0 | Status: DISCONTINUED | COMMUNITY
Start: 2021-07-07 | End: 2023-04-26

## 2023-04-26 RX ORDER — ENOXAPARIN SODIUM 100 MG/ML
40 INJECTION SUBCUTANEOUS
Refills: 0 | Status: DISCONTINUED | COMMUNITY
Start: 2021-07-07 | End: 2023-04-26

## 2023-04-26 RX ORDER — CALCIUM CARBONATE/VITAMIN D3 600 MG-10
600-10 TABLET ORAL
Qty: 30 | Refills: 0 | Status: DISCONTINUED | COMMUNITY
Start: 2020-10-28 | End: 2023-04-26

## 2023-04-26 NOTE — ASSESSMENT
[FreeTextEntry1] : CPE-routine labs drawn today, will follow-up results.\par \par Screenings:\par Mammogram order given.\par Referral to gynecology as she is due for cervical cancer screening\par \par Snoring/enlarged tonsils/swollen glands-discussed that her enlarged tonsils may be contributing to her snoring.  Will refer to ENT.\par \par Cerumen impaction-she can try Debrox eardrops or one-to-one dilution of hydrogen peroxide and warm water.  She can also wait until she sees ENT.  Counseled not to place any objects such as Q-tips in her ears.\par \par Immunizations:\par Believes she had her last Tdap vaccine during her most recent pregnancy in 2021, will be next due in 2031.\par \par RTC in 3 months.

## 2023-04-26 NOTE — PHYSICAL EXAM
[No Acute Distress] : no acute distress [Well Nourished] : well nourished [Well Developed] : well developed [Well-Appearing] : well-appearing [Normal Sclera/Conjunctiva] : normal sclera/conjunctiva [PERRL] : pupils equal round and reactive to light [EOMI] : extraocular movements intact [Normal Outer Ear/Nose] : the outer ears and nose were normal in appearance [Normal Oropharynx] : the oropharynx was normal [No JVD] : no jugular venous distention [No Lymphadenopathy] : no lymphadenopathy [Supple] : supple [Thyroid Normal, No Nodules] : the thyroid was normal and there were no nodules present [No Respiratory Distress] : no respiratory distress  [No Accessory Muscle Use] : no accessory muscle use [Clear to Auscultation] : lungs were clear to auscultation bilaterally [Normal Rate] : normal rate  [Regular Rhythm] : with a regular rhythm [Normal S1, S2] : normal S1 and S2 [No Murmur] : no murmur heard [No Carotid Bruits] : no carotid bruits [No Abdominal Bruit] : a ~M bruit was not heard ~T in the abdomen [No Varicosities] : no varicosities [Pedal Pulses Present] : the pedal pulses are present [No Edema] : there was no peripheral edema [No Palpable Aorta] : no palpable aorta [No Extremity Clubbing/Cyanosis] : no extremity clubbing/cyanosis [Soft] : abdomen soft [Non Tender] : non-tender [Non-distended] : non-distended [No Masses] : no abdominal mass palpated [No HSM] : no HSM [Normal Bowel Sounds] : normal bowel sounds [Normal Posterior Cervical Nodes] : no posterior cervical lymphadenopathy [Normal Anterior Cervical Nodes] : no anterior cervical lymphadenopathy [No CVA Tenderness] : no CVA  tenderness [No Spinal Tenderness] : no spinal tenderness [No Joint Swelling] : no joint swelling [Grossly Normal Strength/Tone] : grossly normal strength/tone [No Rash] : no rash [Coordination Grossly Intact] : coordination grossly intact [No Focal Deficits] : no focal deficits [Normal Gait] : normal gait [Deep Tendon Reflexes (DTR)] : deep tendon reflexes were 2+ and symmetric [Normal Affect] : the affect was normal [Normal Insight/Judgement] : insight and judgment were intact [de-identified] : +cerumen impaction bilaterally, L>R

## 2023-04-26 NOTE — REVIEW OF SYSTEMS
[Fatigue] : fatigue [Earache] : earache [Negative] : Neurological [Fever] : no fever [Chills] : no chills [Nasal Discharge] : no nasal discharge [Sore Throat] : no sore throat [FreeTextEntry4] : swollen glands

## 2023-04-26 NOTE — HEALTH RISK ASSESSMENT
[0] : 2) Feeling down, depressed, or hopeless: Not at all (0) [PHQ-2 Negative - No further assessment needed] : PHQ-2 Negative - No further assessment needed [No] : In the past 12 months have you used drugs other than those required for medical reasons? No [Patient reported PAP Smear was normal] : Patient reported PAP Smear was normal [With Significant Other] : lives with significant other [# of Members in Household ___] :  household currently consist of [unfilled] member(s) [] :  [Feels Safe at Home] : Feels safe at home [Fully functional (bathing, dressing, toileting, transferring, walking, feeding)] : Fully functional (bathing, dressing, toileting, transferring, walking, feeding) [Fully functional (using the telephone, shopping, preparing meals, housekeeping, doing laundry, using] : Fully functional and needs no help or supervision to perform IADLs (using the telephone, shopping, preparing meals, housekeeping, doing laundry, using transportation, managing medications and managing finances) [Never] : Never [CBE1Cetrf] : 0 [MammogramComments] : Has never had mammogram, order given today [PapSmearDate] : 2020 [PapSmearComments] : Referral for gynecology given as she is looking to establish with a new gynecologist

## 2023-04-26 NOTE — HISTORY OF PRESENT ILLNESS
[FreeTextEntry1] : here for c.p.e. [de-identified] : Ev is a 42-year-old F with history of gestational diabetes and elevated LFTs who presents for CPE.  She currently takes no medications.  Last pregnancy was 2 years ago and at that time did have gestational diabetes.  Her last Pap smear was 2020.  She is looking for a new gynecologist and requesting referral.\par \par Specific concerns today:\par She has been noting intermittent swelling of her glands in her neck.  She denies sore throat but did see urgent care during one of the episodes due to experiencing some shortness of breath.  She denies any fever or chills, does have 4 young children.  She also notes that she snores and sleeps in separate room than her  due to the snoring.  She has never seen an ENT.  She is not sure if she stops breathing with the snoring as she does not sleep in the same room as her .  Sometimes due to waking up from snoring she has difficulty falling back asleep.  Reports some ear pressure as well.

## 2023-04-27 ENCOUNTER — NON-APPOINTMENT (OUTPATIENT)
Age: 43
End: 2023-04-27

## 2023-04-27 LAB
25(OH)D3 SERPL-MCNC: 34 NG/ML
ALBUMIN SERPL ELPH-MCNC: 4.2 G/DL
ALP BLD-CCNC: 60 U/L
ALT SERPL-CCNC: 25 U/L
ANION GAP SERPL CALC-SCNC: 10 MMOL/L
AST SERPL-CCNC: 16 U/L
BASOPHILS # BLD AUTO: 0.07 K/UL
BASOPHILS NFR BLD AUTO: 1 %
BILIRUB SERPL-MCNC: 0.4 MG/DL
BUN SERPL-MCNC: 12 MG/DL
CALCIUM SERPL-MCNC: 9.3 MG/DL
CHLORIDE SERPL-SCNC: 103 MMOL/L
CHOLEST SERPL-MCNC: 152 MG/DL
CO2 SERPL-SCNC: 26 MMOL/L
CREAT SERPL-MCNC: 0.78 MG/DL
EGFR: 98 ML/MIN/1.73M2
EOSINOPHIL # BLD AUTO: 0.09 K/UL
EOSINOPHIL NFR BLD AUTO: 1.2 %
ESTIMATED AVERAGE GLUCOSE: 114 MG/DL
GLUCOSE SERPL-MCNC: 90 MG/DL
HBA1C MFR BLD HPLC: 5.6 %
HCT VFR BLD CALC: 41.6 %
HDLC SERPL-MCNC: 57 MG/DL
HGB BLD-MCNC: 13.1 G/DL
IMM GRANULOCYTES NFR BLD AUTO: 0.1 %
LDLC SERPL CALC-MCNC: 80 MG/DL
LYMPHOCYTES # BLD AUTO: 2.38 K/UL
LYMPHOCYTES NFR BLD AUTO: 32.4 %
MAN DIFF?: NORMAL
MCHC RBC-ENTMCNC: 29.1 PG
MCHC RBC-ENTMCNC: 31.5 GM/DL
MCV RBC AUTO: 92.4 FL
MONOCYTES # BLD AUTO: 0.53 K/UL
MONOCYTES NFR BLD AUTO: 7.2 %
NEUTROPHILS # BLD AUTO: 4.27 K/UL
NEUTROPHILS NFR BLD AUTO: 58.1 %
NONHDLC SERPL-MCNC: 94 MG/DL
PLATELET # BLD AUTO: 247 K/UL
POTASSIUM SERPL-SCNC: 4.2 MMOL/L
PROT SERPL-MCNC: 6.9 G/DL
RBC # BLD: 4.5 M/UL
RBC # FLD: 13.2 %
SODIUM SERPL-SCNC: 139 MMOL/L
TRIGL SERPL-MCNC: 69 MG/DL
TSH SERPL-ACNC: 1.06 UIU/ML
WBC # FLD AUTO: 7.35 K/UL

## 2023-05-17 ENCOUNTER — APPOINTMENT (OUTPATIENT)
Dept: OBGYN | Facility: CLINIC | Age: 43
End: 2023-05-17

## 2023-05-22 ENCOUNTER — APPOINTMENT (OUTPATIENT)
Dept: OTOLARYNGOLOGY | Facility: CLINIC | Age: 43
End: 2023-05-22